# Patient Record
Sex: MALE | Race: WHITE | NOT HISPANIC OR LATINO | Employment: OTHER | URBAN - METROPOLITAN AREA
[De-identification: names, ages, dates, MRNs, and addresses within clinical notes are randomized per-mention and may not be internally consistent; named-entity substitution may affect disease eponyms.]

---

## 2017-03-14 ENCOUNTER — OFFICE VISIT (OUTPATIENT)
Dept: OTOLARYNGOLOGY | Facility: CLINIC | Age: 62
End: 2017-03-14
Payer: MEDICARE

## 2017-03-14 VITALS
TEMPERATURE: 99 F | RESPIRATION RATE: 20 BRPM | WEIGHT: 208.13 LBS | DIASTOLIC BLOOD PRESSURE: 76 MMHG | HEART RATE: 84 BPM | SYSTOLIC BLOOD PRESSURE: 122 MMHG

## 2017-03-14 DIAGNOSIS — G70.9 NEUROMUSCULAR DISORDER: ICD-10-CM

## 2017-03-14 DIAGNOSIS — J98.8 AIRWAY OBSTRUCTION: ICD-10-CM

## 2017-03-14 DIAGNOSIS — G47.33 OBSTRUCTIVE SLEEP APNEA: ICD-10-CM

## 2017-03-14 DIAGNOSIS — R06.00 DYSPNEA, UNSPECIFIED TYPE: Primary | ICD-10-CM

## 2017-03-14 DIAGNOSIS — R25.1 TREMOR: ICD-10-CM

## 2017-03-14 DIAGNOSIS — R49.8 HYPOPHONIA: ICD-10-CM

## 2017-03-14 DIAGNOSIS — F09 COGNITIVE DISORDER: ICD-10-CM

## 2017-03-14 DIAGNOSIS — R49.9 VOICE DISTURBANCE: ICD-10-CM

## 2017-03-14 PROCEDURE — 99203 OFFICE O/P NEW LOW 30 MIN: CPT | Mod: PBBFAC | Performed by: OTOLARYNGOLOGY

## 2017-03-14 PROCEDURE — 99999 PR PBB SHADOW E&M-NEW PATIENT-LVL III: CPT | Mod: PBBFAC,,, | Performed by: OTOLARYNGOLOGY

## 2017-03-14 PROCEDURE — 31579 LARYNGOSCOPY TELESCOPIC: CPT | Mod: S$PBB,,, | Performed by: OTOLARYNGOLOGY

## 2017-03-14 PROCEDURE — 31579 LARYNGOSCOPY TELESCOPIC: CPT | Mod: PBBFAC | Performed by: OTOLARYNGOLOGY

## 2017-03-14 PROCEDURE — 99204 OFFICE O/P NEW MOD 45 MIN: CPT | Mod: 25,S$PBB,, | Performed by: OTOLARYNGOLOGY

## 2017-03-14 RX ORDER — INSULIN ASPART 100 [IU]/ML
8 INJECTION, SOLUTION INTRAVENOUS; SUBCUTANEOUS DAILY
COMMUNITY

## 2017-03-14 RX ORDER — PRAMIPEXOLE DIHYDROCHLORIDE 0.12 MG/1
0.12 TABLET ORAL NIGHTLY
COMMUNITY

## 2017-03-14 RX ORDER — OMEPRAZOLE 20 MG/1
20 CAPSULE, DELAYED RELEASE ORAL DAILY
COMMUNITY

## 2017-03-14 RX ORDER — MULTIVITAMIN
1 TABLET ORAL DAILY
COMMUNITY

## 2017-03-14 RX ORDER — FLUTICASONE PROPIONATE 50 MCG
2 SPRAY, SUSPENSION (ML) NASAL DAILY
COMMUNITY

## 2017-03-14 RX ORDER — DULOXETIN HYDROCHLORIDE 30 MG/1
30 CAPSULE, DELAYED RELEASE ORAL NIGHTLY
COMMUNITY

## 2017-03-14 RX ORDER — HYDROCODONE BITARTRATE AND ACETAMINOPHEN 7.5; 325 MG/1; MG/1
1 TABLET ORAL EVERY 8 HOURS PRN
COMMUNITY

## 2017-03-14 RX ORDER — ROSUVASTATIN CALCIUM 20 MG/1
20 TABLET, COATED ORAL DAILY
COMMUNITY

## 2017-03-14 RX ORDER — AMOXICILLIN 500 MG
2 CAPSULE ORAL DAILY
COMMUNITY

## 2017-03-14 RX ORDER — TRAZODONE HYDROCHLORIDE 50 MG/1
50 TABLET ORAL NIGHTLY PRN
COMMUNITY

## 2017-03-14 RX ORDER — PREGABALIN 150 MG/1
150 CAPSULE ORAL 2 TIMES DAILY
COMMUNITY

## 2017-03-14 RX ORDER — ASPIRIN 325 MG
325 TABLET ORAL DAILY
COMMUNITY

## 2017-03-14 RX ORDER — DULOXETIN HYDROCHLORIDE 60 MG/1
60 CAPSULE, DELAYED RELEASE ORAL DAILY
COMMUNITY

## 2017-03-14 RX ORDER — LORATADINE 10 MG/1
10 TABLET ORAL DAILY
COMMUNITY

## 2017-03-14 RX ORDER — FERROUS SULFATE, DRIED 160(50) MG
1 TABLET, EXTENDED RELEASE ORAL DAILY
COMMUNITY

## 2017-03-14 RX ORDER — TAMSULOSIN HYDROCHLORIDE 0.4 MG/1
0.4 CAPSULE ORAL DAILY
COMMUNITY

## 2017-03-14 RX ORDER — CYCLOBENZAPRINE HCL 10 MG
10 TABLET ORAL 3 TIMES DAILY PRN
COMMUNITY

## 2017-03-14 NOTE — PROGRESS NOTES
[facility]  Department of Otorhinolaryngology and Communication Sciences    Александр Liu is a 61 y.o. male who presents to the [facility] for consultation at the kind request of Dr. Yvan Ramirez for further evaluation of dyspnea. Specifically, during recent bronchoscopy with Dr. Mendoza, supine airway obstruction was noted, suspected to be due to prolapse of redundant arytenoid mucosa.    He complains of significant dyspnea, which is at times severe. Onset was sudden, following triple bypass heart surgery in May 2016. This was elective, uncomplicated heart surgery which entailed no more than 24 hours of intubation. Duration of symptoms is nearly 1 year. Time course is constant. Symptoms are worsening. Exacerbating factors include exertion. Alleviating factors include rest. Throughout these episodes, both he and his family deny any sonorous breathing.   He also has a relatively new diagnosis of GABRIEL and is on CPAP, with settings of 14, for the last 2-3 months. Despite regular utilization of CPAP, he routinely awakens from sleep short of breath. He has also noted increasing, progressive hoarseness ,primarily characterized as difficulty projecting his voice and getting loud.     He has a remote mild tobacco history and quit smoking about 40 years ago (2-3 cigarettes per week). He used to work in maintenance but retired in 2008. [place] Even on minimal ambulation, he becomes quite symptomatic. Dr. Mendoza did have him on 3 different inhalers. These provided him with no significant benefit and have been discontinued due to exacerbation of his glycemic control. He is diabetic and is on an insulin pump. He also has a history of a right-sided CEA.     Dr. Ramirez has noted significant redundant arytenoid mucosa and initially recommended acid-suppression. He did not note any evidence of upper airway obstruction in his office exam. He did send the patient to Dr. He in [place] for a second  opinion; he mainly provided reassurance to the patient and his family. Dr. Mendoza has obtained PFT which show flattening of both the inspiratory and expiratory limbs, which may be consistent with upper airway obstruction. During a sedated bronchoscopy, she noted complete collapse and obstruction of the supraglottis while the patient was supine, but this resolved on upright positioning. Specifically, she and Dr. Ramirez query whether any surgical intervention addressing redundant arytenoid mucosa may be indicated.    On further questioning of both him and his family members, he acknowledges additional symptoms including the following:  - progressive weakness of his lower extremity muscles, with significant cramping of these muscles even after walking short distances  - intermittent cramping of his hands, with some degree of mild contracture  - mild cognitive decline  - increased difficulty with fine motor tasks such as buttoning a shirt  - increased, progressive cervical kyphosis    Voice Handicap Index-10 (VHI-10) score is 9.   Reflux Symptom Index (RSI) score is 35.   Eating Assessment Tool-10 (EAT-10) score is 9.   Dyspnea Index (DI) score is 35.  Cough Severity Index (CSI) score is 18.    Past Medical History  He has a past medical history of Cerebrovascular disease; Coronary artery disease; Diabetes mellitus; Herniated lumbar disc without myelopathy; Hypertension; and Type 1 diabetes mellitus.    Past Surgical History  He has a past surgical history that includes cabg and Back surgery.    Family History  Mother - heart disease  Father - heart disease  Brother - DM  Sister - bone cancer    Social History  He reports that he has never smoked. His smokeless tobacco use includes Snuff.    Allergies  He is allergic to norflex [orphenadrine citrate]; nubain [nalbuphine]; vistaril [hydroxyzine pamoate]; and iodine and iodide containing products.    Medications  He has a current medication list which includes the  following prescription(s): ascorbic acid (vitamin c), aspirin, blood sugar diagnostic, calcium-vitamin d3, cyclobenzaprine, duloxetine, duloxetine, fish oil-omega-3 fatty acids, fluticasone, hydrocodone-acetaminophen 7.5-325mg, insulin aspart, loratadine, multivitamin, omeprazole, pramipexole, pregabalin, ranitidine, rosuvastatin, tamsulosin, and trazodone.    Review of Systems   Constitutional: Negative for fever.   HENT: Positive for sore throat.    Eyes: Positive for visual disturbance.   Respiratory: Positive for wheezing.    Cardiovascular: Positive for chest pain.        Post-surgical pain   Gastrointestinal: Positive for nausea.   Musculoskeletal: Positive for arthralgias.   Skin: Negative for rash.   Neurological: Negative for tremors.   Hematological: Bruises/bleeds easily.   Psychiatric/Behavioral: The patient is nervous/anxious.           Objective:     /76 (BP Location: Left arm, Patient Position: Sitting, BP Method: Automatic)  Pulse 84  Temp 98.6 °F (37 °C) (Tympanic)   Resp 20  Wt 94.4 kg (208 lb 1.8 oz)   Physical Exam    Constitutional: comfortable, well dressed  Psychiatric: appropriate affect  Respiratory: comfortably breathing, symmetric chest rise, no stridor  Voice: mildly hypophonic; low breath support; shortened phonation time  Cardiovascular: upper extremities non-edematous  Lymphatic: no cervical lymphadenopathy  Neurologic: alert and oriented to time, place, person, and situation; cranial nerves 3-12 grossly intact; intention tremor bilateral UE's on finger-nose-finger; mild weakness of shoulder elevation, SCM strength, and handgrip strength; mild weakness of orbicularis oris  Head: normocephalic; cervical kyphosis  Eyes: conjunctivae and sclerae clear  Ears: normal pinnae, normal external auditory canals, tympanic membranes intact  Nose: mucosa pink and noncongested, no masses, no mucopurulence, no polyps  Oral cavity / oropharynx: no mucosal lesions; mild spasm of soft palate;  mild involuntary tongue movements  Neck: soft, full range of motion, laryngotracheal complex palpable with appropriate landmarks, larynx elevates on swallowing; right-sided CEA scar; significant cervical kyphosis  Indirect laryngoscopy: limited due to gag    Procedure  Flexible Laryngeal Videostroboscopy (45286): Laryngeal videostroboscopy is indicated to assess laryngeal vibratory biomechanics and vocal fold oscillation, which cannot be assessed with a plain light examination. This was carried out transnasally with a distal chip videoendoscope. After verbal consent was obtained, the patient was positioned and the nose was topically decongested with 1% phenylephrine and topically anesthetized with 4% lidocaine. The endoscope was passed through the most patent nasal cavity and positioned to image the nasopharynx, larynx, and hypopharynx in detail. The patient was prompted to perform multiple phonatory and respiratory tasks and was also examined in the supine position.The following features were examined: nasopharyngeal, laryngeal, hypopharyngeal masses; velopharyngeal strength, closure, and symmetry of motion; vocal fold range and symmetry of motion; laryngeal mucosal edema, erythema, inflammation, and hydration; salivary pooling; and gross laryngeal sensation. During phonation, the vocal folds were assesses for glottal closure; mucosal wave; vocal fold lesions; vibratory periodicity, amplitude, and phase symmetry; and vertical height match. The equipment was removed. The patient tolerated the procedure well without complication. All findings were normal except:  - moderate redundant arytenoid mucosa, non-obstructive on quiet, vigorous, or supine respiration  - mild bilateral, symmetric vocal fold atrophy  - poor breath support for voicing, shortened phonation time (~3 sec)  - complete glottal closure, though with tonic, rigid vocal fold position on phonation  - no evidence of vocal fold paralysis or paresis  - no  evidence of fixed or dynamic airway obstruction  - patient was noted to fatigue very easily, with complaints of significant dyspnea on phonatory and respiratory tasks    Data Reviewed    Outside records from Dr. Ramirez and Dr. Mendoza, including bronch report/imaging and PFTs      Assessment:     Александр Liu is a 61 y.o. male with chronic worsening dyspnea and GABRIEL following heart transplant. His laryngeal and pharyngeal exam are within normal limits. However, I appreciate some truncal/limb weakness, a mild upper extremity tremor, and some other mild bulbar findings, for which the etiology is uncertain.       Plan:        I had a discussion with the patient and his family regarding his condition and the further workup and management options.      Reassurance was provided that, at least in the awake setting, I appreciate no evidence of fixed or dynamic upper airway obstruction. However, I witnessed him becoming quite dyspneic simply from the phonatory and respiratory tasks involved in my examination of his larynx. My impression is that this is a consequence of poor strength in his respiratory muscles. Taken in light of some of the other features I noted on history and physical exam, I have concern for an underlying neurological diagnosis, specifically a neuromuscular disorder or other neurodegenerative process.    Should CPAP fail to adequately address his GABRIEL issue, the patient may ultimately benefit from a tracheostomy. However, even following tracheostomy, my suspicion is that he may even require ventilatory support.    I did speak with Dr. Ramirez and Dr. Mendoza regarding my findings. I have reached out to my neuromuscular neurology colleague, Dr. Botello, for comprehensive assessment. We will arrange his appointment in the coming weeks.    He will follow up with me in the future on an as-needed basis.    All questions were answered, and the patient is in agreement with the above.     This visit was 45 minutes  in duration, with over 50% of the time spent in direct face-to-face counseling and coordination of care regarding the issues outlined above.    Ga Suh M.D.  [facility]  Department of Otorhinolaryngology and Communication Sciences

## 2017-03-14 NOTE — MR AVS SNAPSHOT
[facility]  [address]  [facility] [address]  Phone: [phone]  Fax: [phone]                  Александр Liu   3/14/2017 2:20 PM   Office Visit    Description:  Male : 1955   Provider:  Ga Suh MD   Department:  [facility]           Diagnoses this Visit        Comments    Dyspnea, unspecified type    -  Primary     Airway obstruction         Obstructive sleep apnea         Neuromuscular disorder         Tremor         Cognitive disorder         Hypophonia         Voice disturbance                To Do List           Goals (5 Years of Data)     None      Follow-Up and Disposition     Return if symptoms worsen or fail to improve.      [facility]     [facility] On Call Nurse Care Line -  Assistance  Registered nurses in the [facility] On Call Center provide clinical advisement, health education, appointment booking, and other advisory services.  Call for this free service at [phone].             Medications           Message regarding Medications     Verify the changes and/or additions to your medication regime listed below are the same as discussed with your clinician today.  If any of these changes or additions are incorrect, please notify your healthcare provider.             Verify that the below list of medications is an accurate representation of the medications you are currently taking.  If none reported, the list may be blank. If incorrect, please contact your healthcare provider. Carry this list with you in case of emergency.           Current Medications     ascorbic acid, vitamin C, (VITAMIN C) 100 MG tablet Take 100 mg by mouth once daily.    aspirin 325 MG tablet Take 325 mg by mouth once daily.    blood sugar diagnostic (BLOOD GLUCOSE TEST) Strp by Misc.(Non-Drug; Combo Route) route.    calcium-vitamin D3 500 mg(1,250mg) -200 unit per tablet Take 1 tablet by mouth once daily.    cyclobenzaprine  (FLEXERIL) 10 MG tablet Take 10 mg by mouth 3 (three) times daily as needed for Muscle spasms.    duloxetine (CYMBALTA) 30 MG capsule Take 30 mg by mouth every evening.    duloxetine (CYMBALTA) 60 MG capsule Take 60 mg by mouth once daily.    fish oil-omega-3 fatty acids 300-1,000 mg capsule Take 2 g by mouth once daily.    fluticasone (FLONASE) 50 mcg/actuation nasal spray 2 sprays by Each Nare route once daily.    hydrocodone-acetaminophen 7.5-325mg (NORCO) 7.5-325 mg per tablet Take 1 tablet by mouth every 8 (eight) hours as needed for Pain.    insulin aspart (NOVOLOG) 100 unit/mL injection Inject 8 Units into the skin Daily.    loratadine (CLARITIN) 10 mg tablet Take 10 mg by mouth once daily.    multivitamin (THERAGRAN) per tablet Take 1 tablet by mouth once daily.    omeprazole (PRILOSEC) 20 MG capsule Take 20 mg by mouth once daily.    pramipexole (MIRAPEX) 0.125 MG tablet Take 0.125 mg by mouth nightly.    pregabalin (LYRICA) 150 MG capsule Take 150 mg by mouth 2 (two) times daily.    ranitidine (ZANTAC) 75 MG tablet Take 75 mg by mouth 2 (two) times daily.    rosuvastatin (CRESTOR) 20 MG tablet Take 20 mg by mouth once daily.    tamsulosin (FLOMAX) 0.4 mg Cp24 Take 0.4 mg by mouth once daily.    trazodone (DESYREL) 50 MG tablet Take 50 mg by mouth nightly as needed for Insomnia.           Clinical Reference Information           Your Vitals Were     BP Pulse Temp Resp Weight       122/76 (BP Location: Left arm, Patient Position: Sitting, BP Method: Automatic) 84 98.6 °F (37 °C) (Tympanic) 20 94.4 kg (208 lb 1.8 oz)       Blood Pressure          Most Recent Value    BP  122/76      Allergies as of 3/14/2017     Norflex [Orphenadrine Citrate]    Nubain [Nalbuphine]    Vistaril [Hydroxyzine Pamoate]    Iodine And Iodide Containing Products      Immunizations Administered on Date of Encounter - 3/14/2017     None      [facility] Sign-Up     Activating your [facility] account is as easy as 1-2-3!     1) Visit  [email or web address], select Sign Up Now, enter this activation code and your date of birth, then select Next.  [email or web address]  Expires: 4/28/2017  4:29 PM      2) Create a username and password to use when you visit [facility] in the future and select a security question in case you lose your password and select Next.    3) Enter your e-mail address and click Sign Up!    Additional Information  If you have questions, please e-mail [email or web address] or call [phone] to talk to our [facility] staff. Remember, [facility] is NOT to be used for urgent needs. For medical emergencies, dial 911.         Instructions    Neurology referral  Consider tracheostomy for control of sleep apnea  Call with questions       Smoking Cessation     If you would like to quit smoking:   You may be eligible for free services if you are a [place] resident and started smoking cigarettes before September 1, 1988.  Call the Smoking Cessation Trust ([facility]) toll free at (497) [phone] or ([phone].   Call 5-781-QUIT-NOW if you do not meet the above criteria.            Language Assistance Services     ATTENTION: Language assistance services are available, free of charge. Please call [phone].      ATENCIÓN: Si habla jordin, tiene a goff disposición servicios gratuitos de asistencia lingüística. Llame al [phone].     CHÚ Ý: N?u b?n nói Ti?ng Vi?t, có các d?ch v? h? tr? ngôn ng? mi?n phí dành cho b?n. G?i s? [phone].         [facility] complies with applicable Federal civil rights laws and does not discriminate on the basis of race, color, national origin, age, disability, or sex.

## 2017-03-14 NOTE — LETTER
March 17, 2017      Yvan Ramirez MD  [address]  [facility]  [place] [address]           [place] [address] [facility]  [facility] [address]  Phone: [phone]  Fax: [phone]          Patient: Александр Liu   MR Number: [id number]   YOB: 1955   Date of Visit: 3/14/2017       Dear Yvan Ramirez:    Thank you for referring Александр Liu to me for evaluation. Attached you will find relevant portions of my assessment and plan of care.    If you have questions, please do not hesitate to call me. I look forward to following Александр Liu along with you.    Sincerely,    Ga Suh MD    Enclosure  CC:  No Recipients    If you would like to receive this communication electronically, please contact [email or web address] or [phone] to request more information on [employer] Link access.    For providers and/or their staff who would like to refer a patient to [facility], please contact us through our one-stop-shop provider referral line, [facility], at [phone].    If you feel you have received this communication in error or would no longer like to receive these types of communications, please e-mail [email or web address]

## 2017-03-16 ENCOUNTER — TELEPHONE (OUTPATIENT)
Dept: NEUROLOGY | Facility: CLINIC | Age: 62
End: 2017-03-16

## 2017-03-16 NOTE — TELEPHONE ENCOUNTER
----- Message from Ga Suh MD sent at 3/16/2017 12:29 PM CDT -----  Regarding: RE: urgent appt  Thanks, all.  SHAHBAZ    ----- Message -----     From: Stef Botello MD     Sent: 3/16/2017   9:38 AM       To: Kasia Jacques RN, Ga Suh MD, #  Subject: RE: urgent appt                                  Hi Torsten    We will work on getting him in.     [place] - let's see if we can find him a spot to be seen in the next two weeks.    DL  ----- Message -----     From: Ga Suh MD     Sent: 3/15/2017   5:04 PM       To: Kasia Jacques, RN, Stef Botello MD  Subject: urgent appt                                      Shaji,   This is a patient from [place], [place] I saw yesterday (note pending). He has dyspnea, both awake and asleep, on high CPAP settings QHS, with some additional historical features and findings on exam suggestive of a neurodegenerative process. My note is pending.    He and his family are quite distressed about his condition, which has been gradually worsening since heart surgery in May 2016.    I'd appreciate a thorough assessment by you. Any chance you can get him in within the next couple weeks? I know his family would be very appreciative.    Thanks  SHAHBAZ

## 2017-03-22 ENCOUNTER — OFFICE VISIT (OUTPATIENT)
Dept: NEUROLOGY | Facility: CLINIC | Age: 62
End: 2017-03-22
Payer: MEDICARE

## 2017-03-22 VITALS
BODY MASS INDEX: 31.12 KG/M2 | SYSTOLIC BLOOD PRESSURE: 118 MMHG | HEIGHT: 69 IN | HEART RATE: 96 BPM | WEIGHT: 210.13 LBS | DIASTOLIC BLOOD PRESSURE: 71 MMHG

## 2017-03-22 DIAGNOSIS — R53.1 WEAKNESS: ICD-10-CM

## 2017-03-22 DIAGNOSIS — R06.09 DYSPNEA ON EXERTION: ICD-10-CM

## 2017-03-22 DIAGNOSIS — R06.00 DYSPNEA, UNSPECIFIED TYPE: Primary | ICD-10-CM

## 2017-03-22 PROCEDURE — 99213 OFFICE O/P EST LOW 20 MIN: CPT | Mod: PBBFAC | Performed by: PSYCHIATRY & NEUROLOGY

## 2017-03-22 PROCEDURE — 99203 OFFICE O/P NEW LOW 30 MIN: CPT | Mod: S$PBB,,, | Performed by: PSYCHIATRY & NEUROLOGY

## 2017-03-22 PROCEDURE — 99999 PR PBB SHADOW E&M-EST. PATIENT-LVL III: CPT | Mod: PBBFAC,,, | Performed by: PSYCHIATRY & NEUROLOGY

## 2017-03-22 NOTE — LETTER
March 22, 2017      Ga Suh MD  [address] [address]           [facility]  [address]  [place] [address]  Phone: [phone]  Fax: [phone]          Patient: Александр Liu   MR Number: [id number]   YOB: 1955   Date of Visit: 3/22/2017       Dear Dr. Ga Suh:    Thank you for referring Александр Liu to me for evaluation. Attached you will find relevant portions of my assessment and plan of care.    If you have questions, please do not hesitate to call me. I look forward to following Александр Liu along with you.    Sincerely,    Stef Botello MD    Enclosure  CC:  No Recipients    If you would like to receive this communication electronically, please contact [email or web address] or [phone] to request more information on [employer] Link access.    For providers and/or their staff who would like to refer a patient to [facility], please contact us through our one-stop-shop provider referral line, [facility], at [phone].    If you feel you have received this communication in error or would no longer like to receive these types of communications, please e-mail [email or web address]

## 2017-03-22 NOTE — PROGRESS NOTES
Patient Name: Александр Liu  MRN: [id number]    CC: progressive shortness of breath    HPI: Александр Liu is a 61 y.o. insulin-dependent diabetic male is here to talk about progressive shortness of breath. He has to rest frequently when walking or working d/t SOB and muscle fatigue. Has had PFTs which by report demonstrated flattening of flow loop c/w airway obstruction.    Has cramping in feet, stomach and hands for two or three years.     Clears his throat a lot when eating/drinking; no amarjit aspiration. Clears throat frequently during the day.     Has been crankier, with a shorter fuse lately. Will withdraw. No lability.     Has gained weight since CABG.     No diplopia or ptosis.     S/p CABGx3    Has had lower back surgery in 2015. Fractured vertebrae with NFS.       ROS:   Review of Systems   Constitutional: Negative for malaise/fatigue. Negative for weight loss.   HENT: Negative for hearing loss.   Eyes: Negative for blurred vision and double vision.   Respiratory: Negative for shortness of breath and stridor.   Cardiovascular: Negative for chest pain and palpitations.   Gastrointestinal: Negative for nausea, vomiting and constipation.   Genitourinary: Negative for frequency. Negative for urgency.   Musculoskeletal: Negative for joint pain. Negative for myalgias and falls.   Skin: Negative for rash.   Neurological: Negative for dizziness and tremors. Negative for focal weakness and seizures.   Endo/Heme/Allergies: Does not bruise/bleed easily.   Psychiatric/Behavioral: Negative for memory loss. Negative for depression and hallucinations. The patient is not nervous/anxious.      Past Medical History  Past Medical History:   Diagnosis Date    Cerebrovascular disease     Coronary artery disease     Diabetes mellitus     Herniated lumbar disc without myelopathy     Hypertension     Type 1 diabetes mellitus        Medications    Current Outpatient Prescriptions:     ascorbic acid, vitamin C, (VITAMIN C) 100  MG tablet, Take 100 mg by mouth once daily., Disp: , Rfl:     aspirin 325 MG tablet, Take 325 mg by mouth once daily., Disp: , Rfl:     blood sugar diagnostic (BLOOD GLUCOSE TEST) Strp, by Misc.(Non-Drug; Combo Route) route., Disp: , Rfl:     calcium-vitamin D3 500 mg(1,250mg) -200 unit per tablet, Take 1 tablet by mouth once daily., Disp: , Rfl:     cyclobenzaprine (FLEXERIL) 10 MG tablet, Take 10 mg by mouth 3 (three) times daily as needed for Muscle spasms., Disp: , Rfl:     duloxetine (CYMBALTA) 30 MG capsule, Take 30 mg by mouth every evening., Disp: , Rfl:     duloxetine (CYMBALTA) 60 MG capsule, Take 60 mg by mouth once daily., Disp: , Rfl:     fish oil-omega-3 fatty acids 300-1,000 mg capsule, Take 2 g by mouth once daily., Disp: , Rfl:     fluticasone (FLONASE) 50 mcg/actuation nasal spray, 2 sprays by Each Nare route once daily., Disp: , Rfl:     hydrocodone-acetaminophen 7.5-325mg (NORCO) 7.5-325 mg per tablet, Take 1 tablet by mouth every 8 (eight) hours as needed for Pain., Disp: , Rfl:     insulin aspart (NOVOLOG) 100 unit/mL injection, Inject 8 Units into the skin Daily., Disp: , Rfl:     loratadine (CLARITIN) 10 mg tablet, Take 10 mg by mouth once daily., Disp: , Rfl:     multivitamin (THERAGRAN) per tablet, Take 1 tablet by mouth once daily., Disp: , Rfl:     omeprazole (PRILOSEC) 20 MG capsule, Take 20 mg by mouth once daily., Disp: , Rfl:     pramipexole (MIRAPEX) 0.125 MG tablet, Take 0.125 mg by mouth nightly., Disp: , Rfl:     pregabalin (LYRICA) 150 MG capsule, Take 150 mg by mouth 2 (two) times daily., Disp: , Rfl:     ranitidine (ZANTAC) 75 MG tablet, Take 75 mg by mouth 2 (two) times daily., Disp: , Rfl:     rosuvastatin (CRESTOR) 20 MG tablet, Take 20 mg by mouth once daily., Disp: , Rfl:     tamsulosin (FLOMAX) 0.4 mg Cp24, Take 0.4 mg by mouth once daily., Disp: , Rfl:     trazodone (DESYREL) 50 MG tablet, Take 50 mg by mouth nightly as needed for Insomnia., Disp: ,  "Rfl:     Allergies  Review of patient's allergies indicates:   Allergen Reactions    Norflex [orphenadrine citrate]     Nubain [nalbuphine]     Vistaril [hydroxyzine pamoate]     Iodine and iodide containing products Swelling       Social History  Social History     Social History    Marital status:      Spouse name: N/A    Number of children: N/A    Years of education: N/A     Occupational History    Not on file.     Social History Main Topics    Smoking status: Never Smoker    Smokeless tobacco: Current User     Types: Snuff      Comment: occaisionally    Alcohol use Not on file    Drug use: Not on file    Sexual activity: Not on file     Other Topics Concern    Not on file     Social History Narrative       Family History  No family history on file.    Physical Exam  /71  Pulse 96  Ht 5' 9" (1.753 m)  Wt 95.3 kg (210 lb 1.6 oz)  BMI 31.03 kg/m2    General appearance: Well-developed, well-groomed.     Neurologic Exam: The patient is awake, alert and oriented. Language is fluent. Recent and remote memory are normal. Attention span and concentration are normal. Fund of knowledge is appropriate.     Cranial nerves: pupils are round and reactive to light and accommodation. Visual fields are full to confrontation. Fundoscopic exam reveals sharp discs bilaterally, with good venous pulsations. Ocular motility is full in all cardinal positions of gaze. Facial sensation is normal to pinprick and light touch. Corneal reflexes are present bilaterally. Facial activation is symmetric. Hearing is normal bilaterally. Palate elevates symmetrically and gag reflex is intact bilaterally. Shoulder elevation is symmetric and full strength bilaterally. Tongue is midline and neck rotation strength is normal bilaterally. Neck range of motion is normal.     Motor examination of all extremities demonstrates normal bulk and tone in all four limbs. There are no atrophy or fasciculations. Strength is 5/5 in the " upper and lower extremities bilaterally without pronator drift.     Sensory examination is normal to pinprick, vibration and proprioception in the upper and lower extremities bilaterally. Romberg is negative.    Deep tendon reflexes are 2+ and symmetric in the upper and lower extremities bilaterally. Toes are mute bilaterally.     Gait: Normal heel, toe, tandem, and casual gait.    Coordination: Finger to nose and heel to shin testing is normal in both upper and lower extremities. Rapid alternating movements are normal in both upper and lower extremities.     General exam  Cardiovascular: regular rate and rhythm with no murmurs, rubs or gallops. There are no carotid or vertebral artery bruits. Pulses in both upper and lower extremities are symmetric. There is no peripheral edema.   Head and neck: no cervical lymphadenopathy    Assessment and Plan    Александр Liu is a 61 y.o. male with progressive dyspnea. Exam does not reveal evidence for motor neurone disease.     Plan  - EMG  - Labs: MG panel, CK, CBC  - Pulm consult    Shaji Botello MD, GAYATHRI, FAAN  Department of Neurology   [facility] [place], [place]            Shaji Botello MD, GAYATHRI, FAAN  Department of Neurology  [facility] [place] [address]

## 2017-03-22 NOTE — Clinical Note
Hi Patient has progressive BLAKELY but no indicia of MND on exam. Flow loops look globally reduced. Could you take a look at him?

## 2017-03-22 NOTE — Clinical Note
Let's see if we can get him in here in a couple of weeks for the EMG. Would like for him to see pulm on same day

## 2017-03-23 DIAGNOSIS — R06.02 SHORTNESS OF BREATH: Primary | ICD-10-CM

## 2017-04-05 ENCOUNTER — HOSPITAL ENCOUNTER (OUTPATIENT)
Dept: PULMONOLOGY | Facility: CLINIC | Age: 62
Discharge: HOME | End: 2017-04-05
Payer: MEDICARE

## 2017-04-05 ENCOUNTER — OFFICE VISIT (OUTPATIENT)
Dept: PULMONOLOGY | Facility: CLINIC | Age: 62
End: 2017-04-05
Payer: MEDICARE

## 2017-04-05 VITALS
SYSTOLIC BLOOD PRESSURE: 116 MMHG | WEIGHT: 209 LBS | DIASTOLIC BLOOD PRESSURE: 60 MMHG | HEIGHT: 69 IN | OXYGEN SATURATION: 97 % | BODY MASS INDEX: 30.96 KG/M2 | HEART RATE: 79 BPM

## 2017-04-05 DIAGNOSIS — J98.8 AIRWAY OBSTRUCTION: Primary | ICD-10-CM

## 2017-04-05 DIAGNOSIS — R06.02 SHORTNESS OF BREATH: ICD-10-CM

## 2017-04-05 LAB
PRE FEV1 FVC: 71
PRE FEV1: 2.73
PRE FVC: 3.85
PREDICTED FEV1 FVC: 80
PREDICTED FEV1: 3.57
PREDICTED FVC: 4.42

## 2017-04-05 PROCEDURE — 99999 PR PBB SHADOW E&M-EST. PATIENT-LVL IV: CPT | Mod: PBBFAC,,, | Performed by: INTERNAL MEDICINE

## 2017-04-05 PROCEDURE — 99204 OFFICE O/P NEW MOD 45 MIN: CPT | Mod: S$PBB,,, | Performed by: INTERNAL MEDICINE

## 2017-04-05 PROCEDURE — 94010 BREATHING CAPACITY TEST: CPT | Mod: 26,S$PBB,, | Performed by: INTERNAL MEDICINE

## 2017-04-05 PROCEDURE — 99214 OFFICE O/P EST MOD 30 MIN: CPT | Mod: PBBFAC | Performed by: INTERNAL MEDICINE

## 2017-04-05 PROCEDURE — 94729 DIFFUSING CAPACITY: CPT | Mod: 26,S$PBB,, | Performed by: INTERNAL MEDICINE

## 2017-04-05 NOTE — PROGRESS NOTES
"Subjective:       Patient ID: Александр Liu is a 61 y.o. male.  Consult by: Dr. Mendosa  Reason for consult: BLAKELY  Chief Complaint: No chief complaint on file.    HPI Comments: 61 year old male who presented for evaluation. Patient had cardiac bypass surgery in May of 2016. Patient remained intubated for approximately 24 hours after the procedure. Since that time he has had dyspnea on exertion. Additionally, he has episodes of dyspnea and a sensation of his airway closing on him. He was seen by Pulmonary, Dr. Mendoza. Per review of records:  "Dr. Mendoza has obtained PFT which show flattening of both the inspiratory and expiratory limbs, which may be consistent with upper airway obstruction. During a sedated bronchoscopy, she noted complete collapse and obstruction of the supraglottis while the patient was supine, but this resolved on upright positioning. Specifically, she and Dr. Ramirez query whether any surgical intervention addressing redundant arytenoid mucosa may be indicated."   Patient has had multiple ENT evaluations without any definitive diagnosis.   Patient had a negative cardiac workup.   Patient diagnosed with GABRIEL and placed on CPAP. He states that the pressure of 14  is too high for him.  Dr. Suh in ENT noted possible proximal muscle weakness and sent him to ALS clinic for evaluation.   Dr. NEWSOME in ALS clinic did not note any neurological weakness/upper motor neuron disease.     Patient denies sputum production, fever, chills, weight loss. He has a remote <10 pack/year smoking history. Occasional chewing tobacco use.      Review of Systems   Constitutional: Positive for activity change and fatigue. Negative for fever, chills, appetite change, night sweats and weakness.   HENT: Negative for postnasal drip, rhinorrhea, sore throat and congestion.    Respiratory: Positive for cough, hemoptysis, choking and shortness of breath. Negative for apnea.    Endocrine: Negative for cold intolerance and heat " "intolerance.    Musculoskeletal: Negative for arthralgias and back pain.   Skin: Negative for rash.   Gastrointestinal: Negative for nausea, abdominal pain and abdominal distention.   Neurological: Negative for dizziness and headaches.   Hematological: Negative for adenopathy. Does not bruise/bleed easily.   Psychiatric/Behavioral: Negative for confusion. The patient is not nervous/anxious.        Past Medical History:   Diagnosis Date    Cerebrovascular disease     Coronary artery disease     Diabetes mellitus     Herniated lumbar disc without myelopathy     Hypertension     Type 1 diabetes mellitus      Social History     Social History    Marital status:      Spouse name: N/A    Number of children: N/A    Years of education: N/A     Occupational History    Not on file.     Social History Main Topics    Smoking status: Never Smoker    Smokeless tobacco: Current User     Types: Snuff      Comment: occaisionally    Alcohol use Not on file    Drug use: Not on file    Sexual activity: Not on file     Other Topics Concern    Not on file     Social History Narrative     Past Surgical History:   Procedure Laterality Date    BACK SURGERY      cabg       No family history on file.    Objective:       Vitals:    04/05/17 1408   BP: 116/60   Pulse: 79   SpO2: 97%   Weight: 94.8 kg (209 lb)   Height: 5' 9" (1.753 m)   PainSc: 0-No pain       Physical Exam   Constitutional: He is oriented to person, place, and time. He appears well-developed and well-nourished. No distress.   HENT:   Head: Normocephalic.   Nose: No mucosal edema.   Mouth/Throat: No oropharyngeal exudate.   Neck: Normal range of motion. Neck supple.   Cardiovascular: Normal rate, regular rhythm and normal heart sounds.    Pulmonary/Chest: Normal expansion. He has no decreased breath sounds. He has no rhonchi. He has no rales. He exhibits no tenderness.   Musculoskeletal: Normal range of motion. He exhibits no edema or deformity. "   Lymphadenopathy:     He has no cervical adenopathy.     He has no axillary adenopathy.   Neurological: He is alert and oriented to person, place, and time. He displays normal reflexes. No cranial nerve deficit.   Skin: Skin is warm and dry. No rash noted. He is not diaphoretic. No erythema.   Vitals reviewed.    Personal Diagnostic Review  Pulmonary function tests: Flattening of the insp and exp loops.  No flowsheet data found.      Assessment:       1. Airway obstruction        Outpatient Encounter Prescriptions as of 4/5/2017   Medication Sig Dispense Refill    ascorbic acid, vitamin C, (VITAMIN C) 100 MG tablet Take 100 mg by mouth once daily.      aspirin 325 MG tablet Take 325 mg by mouth once daily.      blood sugar diagnostic (BLOOD GLUCOSE TEST) Strp by Misc.(Non-Drug; Combo Route) route.      calcium-vitamin D3 500 mg(1,250mg) -200 unit per tablet Take 1 tablet by mouth once daily.      cyclobenzaprine (FLEXERIL) 10 MG tablet Take 10 mg by mouth 3 (three) times daily as needed for Muscle spasms.      duloxetine (CYMBALTA) 30 MG capsule Take 30 mg by mouth every evening.      duloxetine (CYMBALTA) 60 MG capsule Take 60 mg by mouth once daily.      fish oil-omega-3 fatty acids 300-1,000 mg capsule Take 2 g by mouth once daily.      fluticasone (FLONASE) 50 mcg/actuation nasal spray 2 sprays by Each Nare route once daily.      hydrocodone-acetaminophen 7.5-325mg (NORCO) 7.5-325 mg per tablet Take 1 tablet by mouth every 8 (eight) hours as needed for Pain.      insulin aspart (NOVOLOG) 100 unit/mL injection Inject 8 Units into the skin Daily.      loratadine (CLARITIN) 10 mg tablet Take 10 mg by mouth once daily.      multivitamin (THERAGRAN) per tablet Take 1 tablet by mouth once daily.      omeprazole (PRILOSEC) 20 MG capsule Take 20 mg by mouth once daily.      pramipexole (MIRAPEX) 0.125 MG tablet Take 0.125 mg by mouth nightly.      pregabalin (LYRICA) 150 MG capsule Take 150 mg by mouth  2 (two) times daily.      ranitidine (ZANTAC) 75 MG tablet Take 75 mg by mouth 2 (two) times daily.      rosuvastatin (CRESTOR) 20 MG tablet Take 20 mg by mouth once daily.      tamsulosin (FLOMAX) 0.4 mg Cp24 Take 0.4 mg by mouth once daily.      trazodone (DESYREL) 50 MG tablet Take 50 mg by mouth nightly as needed for Insomnia.       No facility-administered encounter medications on file as of 4/5/2017.      Orders Placed This Encounter   Procedures    Ambulatory consult to ENT     Referral Priority:   Routine     Referral Type:   Consultation     Referral Reason:   Specialty Services Required     Requested Specialty:   Otolaryngology     Number of Visits Requested:   1     Plan:   61 year old male with     Dyspnea- Patient has had an extensive workup. I agree with Dr. Botello that this is unlikely to be neuromuscular weakness. All of the patient's symptoms seem to suggest upper airway obstruction. Dr. Mendoza bronchoscopy pictures seem to suggest supraglottic airway issues more specifically redundant arytenoid mucosa. Spirometry repeated here is similar to spirometry performed in [place]. There is flattening of the inspiratory and expiratory flow volume loop. Discussed case with Dr. Haskins, [facility]. Symptoms are not related to sleep but seem to be continuous.   -Recommend evaluation with ENT with supine airway evaluation.   -Will defer imaging of the neck to ENT.     Thank you for allowing me to participate in Mr. Noe hansen.     Genesis Stevens MD

## 2017-04-05 NOTE — LETTER
April 5, 2017      Stef Botello MD  [address] [facility] [address]           [facility]  [address]  [place] [address]  Phone: [phone]          Patient: Александр Liu   MR Number: [id number]   YOB: 1955   Date of Visit: 4/5/2017       Dear Dr. Stef Botello:    Thank you for referring Александр Liu to me for evaluation. Attached you will find relevant portions of my assessment and plan of care.    If you have questions, please do not hesitate to call me. I look forward to following Александр Liu along with you.    Sincerely,    Genesis Stevens MD    Enclosure  CC:  No Recipients    If you would like to receive this communication electronically, please contact [email or web address] or [phone] to request more information on [employer] access.    For providers and/or their staff who would like to refer a patient to [facility], please contact us through our one-stop-shop provider referral line, [facility], at [phone].    If you feel you have received this communication in error or would no longer like to receive these types of communications, please e-mail [email or web address]

## 2017-06-02 ENCOUNTER — TELEPHONE (OUTPATIENT)
Dept: OTOLARYNGOLOGY | Facility: CLINIC | Age: 62
End: 2017-06-02

## 2017-06-02 NOTE — TELEPHONE ENCOUNTER
----- Message from Maral Perkins sent at 6/2/2017 11:22 AM CDT -----  Contact: patient wife  [phone]-please call above patient wife has question for the nurse waiting on your call

## 2017-06-07 ENCOUNTER — OFFICE VISIT (OUTPATIENT)
Dept: OTOLARYNGOLOGY | Facility: CLINIC | Age: 62
End: 2017-06-07
Payer: MEDICARE

## 2017-06-07 VITALS
DIASTOLIC BLOOD PRESSURE: 67 MMHG | WEIGHT: 206.81 LBS | SYSTOLIC BLOOD PRESSURE: 111 MMHG | HEART RATE: 78 BPM | BODY MASS INDEX: 30.54 KG/M2 | TEMPERATURE: 97 F

## 2017-06-07 DIAGNOSIS — G47.33 OBSTRUCTIVE SLEEP APNEA: ICD-10-CM

## 2017-06-07 DIAGNOSIS — R06.02 SHORTNESS OF BREATH: Primary | ICD-10-CM

## 2017-06-07 PROCEDURE — 99999 PR PBB SHADOW E&M-EST. PATIENT-LVL III: CPT | Mod: PBBFAC,,, | Performed by: OTOLARYNGOLOGY

## 2017-06-07 PROCEDURE — 99213 OFFICE O/P EST LOW 20 MIN: CPT | Mod: PBBFAC | Performed by: OTOLARYNGOLOGY

## 2017-06-07 PROCEDURE — 99215 OFFICE O/P EST HI 40 MIN: CPT | Mod: S$PBB,,, | Performed by: OTOLARYNGOLOGY

## 2017-06-07 NOTE — LETTER
Beverley 10, 2017      Genesis Stevens MD  [address]  [address]  [place] [address]           [facility]  [address]  [facility] [address]  Phone: [phone]  Fax: [phone]          Patient: Александр Liu   MR Number: [id number]   YOB: 1955   Date of Visit: 6/7/2017       Dear Dr. Genesis Stevens:    Thank you for referring Алескандр Liu to me for evaluation. Attached you will find relevant portions of my assessment and plan of care.    If you have questions, please do not hesitate to call me. I look forward to following Александр Liu along with you.    Sincerely,        Enclosure  CC:  No Recipients    If you would like to receive this communication electronically, please contact [email or web address] or [phone] to request more information on [employer] access.    For providers and/or their staff who would like to refer a patient to [facility], please contact us through our one-stop-shop provider referral line, [facility] , at [phone].    If you feel you have received this communication in error or would no longer like to receive these types of communications, please e-mail [email or web address]

## 2017-06-20 NOTE — PROGRESS NOTES
[facility]  Department of Otorhinolaryngology and Communication Sciences    Александр Liu is a 62 y.o. male who presents in follow up for dyspnea and who during recent bronchoscopy with Dr. Mendoza, supine airway obstruction was noted, suspected to be due to prolapse of redundant arytenoid mucosa.    He complains of significant dyspnea, which is at times severe. Onset was sudden, following triple bypass heart surgery in May 2016. This was elective, uncomplicated heart surgery which entailed no more than 24 hours of intubation. Duration of symptoms is nearly 1 year. Time course is constant. Symptoms are worsening. Exacerbating factors include exertion. Alleviating factors include rest. Throughout these episodes, both he and his family deny any sonorous breathing.   He also has a relatively new diagnosis of GABRIEL and is on CPAP, with settings of 14, for the last 2-3 months. Despite regular utilization of CPAP, he routinely awakens from sleep short of breath. He has also noted increasing, progressive hoarseness, primarily characterized as difficulty projecting his voice and getting loud.     He has a remote mild tobacco history and quit smoking about 40 years ago (2-3 cigarettes per week). He used to work in maintenance but retired in 2008. [place]. Even on minimal ambulation, he becomes quite symptomatic. Dr. Mendoza did have him on 3 different inhalers. These provided him with no significant benefit and have been discontinued due to exacerbation of his glycemic control. He is diabetic and is on an insulin pump. He also has a history of a right-sided CEA.     Dr. Ramirez has noted significant redundant arytenoid mucosa and initially recommended acid-suppression. He did not note any evidence of upper airway obstruction in his office exam. He did send the patient to Dr. He in [place] for a second opinion; he mainly provided reassurance to the patient and his family. Dr. Mendoza has obtained PFT  which show flattening of both the inspiratory and expiratory limbs, which may be consistent with upper airway obstruction. During a sedated bronchoscopy, she noted complete collapse and obstruction of the supraglottis while the patient was supine, but this resolved on upright positioning. Specifically, she and Dr. Ramirez query whether any surgical intervention addressing redundant arytenoid mucosa may be indicated.    He was last seen and evaluated by Dr. Suh who performed flexible laryngoscopy both upright and supine while awake without evidence of upper airway obstruction. At that time Dr. Suh noted some limb weakness and muscle fatigue and referred him for evaluation by Dr. Botello who did not find evidence of motor neuron disease. He ordered an EMG at that time which has not been done due to scheduling issues. A pulmonary workup by Dr. Stevens did not reveal a Pulmonary etiology.      Past Medical History  He has a past medical history of Cerebrovascular disease; Coronary artery disease; Diabetes mellitus; Herniated lumbar disc without myelopathy; Hypertension; and Type 1 diabetes mellitus.    Past Surgical History  He has a past surgical history that includes cabg and Back surgery.    Family History  Mother - heart disease  Father - heart disease  Brother - DM  Sister - bone cancer    Social History  He reports that he has never smoked. His smokeless tobacco use includes Snuff.    Allergies  He is allergic to norflex [orphenadrine citrate]; nubain [nalbuphine]; vistaril [hydroxyzine pamoate]; and iodine and iodide containing products.    Medications  He has a current medication list which includes the following prescription(s): ascorbic acid (vitamin c), aspirin, blood sugar diagnostic, calcium-vitamin d3, cyclobenzaprine, duloxetine, duloxetine, fish oil-omega-3 fatty acids, fluticasone, hydrocodone-acetaminophen 7.5-325mg, insulin aspart, loratadine, multivitamin, omeprazole, pramipexole, pregabalin,  ranitidine, rosuvastatin, tamsulosin, and trazodone.    Review of Systems   Constitutional: Negative for fever.   HENT: Positive for sore throat.    Eyes: Positive for visual disturbance.   Respiratory: Positive for wheezing.    Cardiovascular: Positive for chest pain.        Post-surgical pain   Gastrointestinal: Positive for nausea.   Musculoskeletal: Positive for arthralgias.   Skin: Negative for rash.   Neurological: Negative for tremors.   Hematological: Bruises/bleeds easily.   Psychiatric/Behavioral: The patient is nervous/anxious.           Objective:     /67 (Patient Position: Sitting)   Pulse 78   Temp 97 °F (36.1 °C) (Tympanic)   Wt 93.8 kg (206 lb 12.7 oz)   BMI 30.54 kg/m²    Physical Exam    Constitutional: comfortable, well dressed  Psychiatric: appropriate affect  Respiratory: comfortably breathing, symmetric chest rise, no stridor  Voice: mildly hypophonic; low breath support; shortened phonation time  Cardiovascular: upper extremities non-edematous  Lymphatic: no cervical lymphadenopathy  Neurologic: alert and oriented to time, place, person, and situation; cranial nerves 3-12 grossly intact; intention tremor bilateral UE's on finger-nose-finger; mild weakness of shoulder elevation, SCM strength, and handgrip strength; mild weakness of orbicularis oris  Head: normocephalic; cervical kyphosis  Eyes: conjunctivae and sclerae clear  Ears: normal pinnae, normal external auditory canals, tympanic membranes intact  Nose: mucosa pink and noncongested, no masses, no mucopurulence, no polyps  Oral cavity / oropharynx: no mucosal lesions; mild spasm of soft palate; mild involuntary tongue movements  Neck: soft, full range of motion, laryngotracheal complex palpable with appropriate landmarks, larynx elevates on swallowing; right-sided CEA scar; significant cervical kyphosis  Indirect laryngoscopy: limited due to gag      Reviewed Video from previous laryngoscopy with   Vikash:  Procedure  Flexible Laryngeal Videostroboscopy (87755): Laryngeal videostroboscopy is indicated to assess laryngeal vibratory biomechanics and vocal fold oscillation, which cannot be assessed with a plain light examination. This was carried out transnasally with a distal chip videoendoscope. After verbal consent was obtained, the patient was positioned and the nose was topically decongested with 1% phenylephrine and topically anesthetized with 4% lidocaine. The endoscope was passed through the most patent nasal cavity and positioned to image the nasopharynx, larynx, and hypopharynx in detail. The patient was prompted to perform multiple phonatory and respiratory tasks and was also examined in the supine position.The following features were examined: nasopharyngeal, laryngeal, hypopharyngeal masses; velopharyngeal strength, closure, and symmetry of motion; vocal fold range and symmetry of motion; laryngeal mucosal edema, erythema, inflammation, and hydration; salivary pooling; and gross laryngeal sensation. During phonation, the vocal folds were assesses for glottal closure; mucosal wave; vocal fold lesions; vibratory periodicity, amplitude, and phase symmetry; and vertical height match. The equipment was removed. The patient tolerated the procedure well without complication. All findings were normal except:  - moderate redundant arytenoid mucosa, non-obstructive on quiet, vigorous, or supine respiration  - mild bilateral, symmetric vocal fold atrophy  - poor breath support for voicing, shortened phonation time (~3 sec)  - complete glottal closure, though with tonic, rigid vocal fold position on phonation  - no evidence of vocal fold paralysis or paresis  - no evidence of fixed or dynamic airway obstruction  - patient was noted to fatigue very easily, with complaints of significant dyspnea on phonatory and respiratory tasks    Data Reviewed    Outside records from Dr. Ramirez and Dr. Mendoza, including bronch  report/imaging and PFTs      Assessment:     Александр Liu is a 62 y.o. male with chronic worsening dyspnea and GABRIEL following heart transplant. His laryngeal and pharyngeal exam are within normal limits both upright and supine.     Plan:        I had a long discussion with the patient and his family with Dr. Shu.    We again discussed the findings of his previous laryngoscopy which was performed both upright and supine without any evidence of airway obstruction. He does not have any stridor at rest. His reported dyspnea at night remains concerning. He is planning a repeat sleep study and possibly reconfiguring his CPAP settings based on these results. We again discussed that the ultimate GABRIEL treatment is a tracheostomy, but he still may require ventilatory support. He will schedule the EMG study as recommended by Dr. Botello.      He will follow up with myself or Dr. Suh in the future on an as-needed basis.    All questions were answered, and the patient is in agreement with the above.     This visit was 45 minutes in duration, with over 50% of the time spent in direct face-to-face counseling and coordination of care regarding the issues outlined above.